# Patient Record
Sex: FEMALE | Race: BLACK OR AFRICAN AMERICAN | NOT HISPANIC OR LATINO | Employment: STUDENT | URBAN - METROPOLITAN AREA
[De-identification: names, ages, dates, MRNs, and addresses within clinical notes are randomized per-mention and may not be internally consistent; named-entity substitution may affect disease eponyms.]

---
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
## 2025-03-05 ENCOUNTER — HOSPITAL ENCOUNTER (EMERGENCY)
Facility: HOSPITAL | Age: 20
Discharge: HOME | End: 2025-03-05
Attending: EMERGENCY MEDICINE
Payer: COMMERCIAL

## 2025-03-05 VITALS
DIASTOLIC BLOOD PRESSURE: 70 MMHG | RESPIRATION RATE: 18 BRPM | WEIGHT: 293 LBS | OXYGEN SATURATION: 96 % | SYSTOLIC BLOOD PRESSURE: 138 MMHG | TEMPERATURE: 99 F | BODY MASS INDEX: 47.09 KG/M2 | HEIGHT: 66 IN | HEART RATE: 109 BPM

## 2025-03-05 DIAGNOSIS — M79.604 PAIN OF RIGHT LOWER EXTREMITY: ICD-10-CM
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
## 2025-03-05 DIAGNOSIS — M25.561 ACUTE PAIN OF RIGHT KNEE: ICD-10-CM

## 2025-03-05 DIAGNOSIS — M25.571 ACUTE RIGHT ANKLE PAIN: Primary | ICD-10-CM

## 2025-03-05 LAB
B-HCG UR QL: NEGATIVE
CTP QC/QA: YES

## 2025-03-05 PROCEDURE — 81025 URINE PREGNANCY TEST: CPT

## 2025-03-05 PROCEDURE — 25000003 PHARM REV CODE 250

## 2025-03-05 PROCEDURE — 99283 EMERGENCY DEPT VISIT LOW MDM: CPT | Mod: 25

## 2025-03-05 RX ORDER — LIDOCAINE 50 MG/G
1 PATCH TOPICAL DAILY
Qty: 14 PATCH | Refills: 0 | Status: SHIPPED | OUTPATIENT
Start: 2025-03-05

## 2025-03-05 RX ORDER — KETOROLAC TROMETHAMINE 10 MG/1
10 TABLET, FILM COATED ORAL
Status: COMPLETED | OUTPATIENT
Start: 2025-03-05 | End: 2025-03-05

## 2025-03-05 RX ORDER — IBUPROFEN 600 MG/1
600 TABLET ORAL EVERY 6 HOURS PRN
Qty: 20 TABLET | Refills: 0 | Status: SHIPPED | OUTPATIENT
Start: 2025-03-05

## 2025-03-05 RX ADMIN — KETOROLAC TROMETHAMINE 10 MG: 10 TABLET, FILM COATED ORAL at 03:03

## 2025-03-05 NOTE — DISCHARGE INSTRUCTIONS
Take ibuprofen every 6 hours as needed for pain.  Rest, ice, elevate, compress the right ankle.  Follow up with primary care doctor and orthopedic doctor.\  Thank you for coming to our Emergency Department today. It is important to remember that some problems or medical conditions are difficult to diagnose and may not be found or addressed during your Emergency Department visit.  These conditions often start with non-specific symptoms and can only be diagnosed on follow up visits with your primary care physician or specialist when the symptoms continue or change. Please remember that all medical conditions can change, and we cannot predict how you will be feeling tomorrow or the next day. Return to the ER with any questions/concerns, new/concerning symptoms, worsening or failure to improve.       Be sure to follow up with your primary care doctor and review all labs/imaging/tests that were performed during your ER visit with them. It is very common for us to identify non-emergent incidental findings which must be followed up with your primary care physician.  Some labs/imaging/tests may be outside of the normal range, and require non-emergent follow-up and/or further investigation/treatment/procedures/testing to help diagnose/exclude/prevent complications or other potentially serious medical conditions. Some abnormalities may not have been discussed or addressed during your ER visit. Some lab results may not return during your ER visit but can be accessible by downloading the free [facility] My[facility] lesley or by visiting [email or web address] . It is important for you to review all labs/imaging/tests which are outside of the normal range with your physician.    An ER visit does not replace a primary care visit, and many screening tests or follow-up tests cannot be ordered by an ER doctor or performed by the ER. Some tests may even require pre-approval.    If you do not have a primary care doctor, you may contact the one  listed on your discharge paperwork or you may also call the [facility] Appointment Desk at [phone] , or [employer]. at  [phone] to schedule an appointment, or establish care with a primary care doctor or even a specialist and to obtain information about local resources. It is important to your health that you have a primary care doctor.    Please take all medications as directed. We have done our best to select a medication for you that will treat your condition however, all medications may potentially have side-effects and it is impossible to predict which medications may give you side-effects or what those side-effects (if any) those medications may give you.  If you feel that you are having a negative effect or side-effect of any medication you should stop taking those medications immediately and seek medical attention. If you feel that you are having a life-threatening reaction call 911.        Do not drive, swim, climb to height, take a bath, operate heavy machinery, drink alcohol or take potentially sedating medications, sign any legal documents or make any important decisions for 24 hours if you have received any pain medications, sedatives or mood altering drugs during your ER visit or within 24 hours of taking them if they have been prescribed to you.     You can find additional resources for Dentists, hearing aids, durable medical equipment, low cost pharmacies and other resources at [email or web address]

## 2025-03-05 NOTE — Clinical Note
"Kristen Spencer" Steve was seen and treated in our emergency department on 3/5/2025.  She may return to school on 03/07/2025.      If you have any questions or concerns, please don't hesitate to call.      Clark Beltran PA-C"

## 2025-03-05 NOTE — ED PROVIDER NOTES
Encounter Date: 3/5/2025    SCRIBE #1 NOTE: I, Paulina Mauricio, am scribing for, and in the presence of,  Clark Romero PA-C. I have scribed the following portions of the note - Other sections scribed: HPI/ROS.       History     Chief Complaint   Patient presents with    Leg Pain     Right inner thigh pain radiating to back of right calf. Started at approx noon today. Denies any recent trauma. Hasn't treated w/ otc meds     Patient is a 19 y.o. female, with no pertinent PMHx, who presents to the ED s/p fall at work around noon today. Patient reports slipping at work with her right leg going laterally and behind her. Pt denies falling on her leg.  Pt reports pain in posterior right ankle, right calf, right inner thigh and posterior right knee pain. Additional history is provided by independent historian: Pt's mother believes pt caught herself while falling and twisted her ankle. No medication pta. Denies history of blood thrombus in chest or leg.  Denies history of cancer. Pt is on birth control pill. No recent travel or surgeries. No other exacerbating or alleviating factors. Denies chest pain, shortness of breath, swelling or other associated symptoms.       The history is provided by the patient and a parent.     Review of patient's allergies indicates:  No Known Allergies  Past Medical History:   Diagnosis Date    Allergy     Right ovarian cyst 2022     Past Surgical History:   Procedure Laterality Date    ROBOT-ASSISTED LAPAROSCOPIC EXCISION OF CYST OF OVARY USING DA WILBERTO XI Right 10/3/2022    Procedure: XI ROBOTIC EXCISION, CYST, OVARY;  Surgeon: Erick Eagle MD;  Location: [facility];  Service: OB/GYN;  Laterality: Right;     Family History   Family history unknown: Yes     Social History[1]  Review of Systems   Constitutional:  Negative for fever.   HENT:  Negative for sore throat.    Respiratory:  Negative for shortness of breath.    Cardiovascular:  Negative for chest pain.   Gastrointestinal:  Negative for  "abdominal pain, nausea and vomiting.   Genitourinary:  Negative for dysuria.   Musculoskeletal:  Positive for arthralgias and myalgias. Negative for back pain, gait problem, joint swelling, neck pain and neck stiffness.   Skin:  Negative for rash.   Neurological:  Negative for dizziness, syncope, weakness and numbness.   Hematological:  Does not bruise/bleed easily.   Psychiatric/Behavioral:  Negative for confusion.    All other systems reviewed and are negative.      Physical Exam     Initial Vitals [03/05/25 1450]   BP Pulse Resp Temp SpO2   138/70 109 18 98.9 °F (37.2 °C) 96 %      MAP       --         Physical Exam    Nursing note and vitals reviewed.  Constitutional: She appears well-developed and well-nourished.   HENT:   Head: Normocephalic and atraumatic.   Right Ear: External ear normal.   Left Ear: External ear normal.   Nose: Nose normal. Mouth/Throat: Oropharynx is clear and moist.   Eyes: Conjunctivae and lids are normal.   Neck: Trachea normal. Neck supple.   Cardiovascular:  Normal rate, regular rhythm, S1 normal, S2 normal, normal heart sounds and normal pulses.     Exam reveals no gallop and no friction rub.       No murmur heard.  Pulmonary/Chest: Effort normal and breath sounds normal. No respiratory distress. She has no decreased breath sounds. She has no wheezes. She has no rhonchi. She has no rales.   Abdominal: Abdomen is soft. Bowel sounds are normal. She exhibits no distension. There is no abdominal tenderness. There is no rebound, no guarding, no tenderness at McBurney's point and negative Stearns's sign.   Musculoskeletal:         General: Normal range of motion.      Cervical back: Neck supple.      Right hip: No tenderness or bony tenderness. Normal range of motion. Normal strength.      Left hip: No tenderness or bony tenderness. Normal range of motion. Normal strength.      Right upper leg: Tenderness ("soreness") present. No swelling or bony tenderness.      Left upper leg: No " swelling, tenderness or bony tenderness.      Right knee: Bony tenderness present. No swelling. Normal range of motion. Tenderness present.      Left knee: No swelling or bony tenderness. Normal range of motion. No tenderness.      Right lower leg: Tenderness and bony tenderness present. No swelling or deformity. No edema.      Left lower leg: No tenderness or bony tenderness. No edema.      Right ankle: No swelling. Tenderness present. Normal range of motion. Normal pulse.      Right Achilles Tendon: Em's test negative.      Left ankle: No swelling. Tenderness present. Normal range of motion. Normal pulse.      Left Achilles Tendon: Em's test negative.      Right foot: Normal range of motion and normal capillary refill. No swelling, tenderness or bony tenderness. Normal pulse.      Left foot: Normal range of motion and normal capillary refill. No swelling, tenderness or bony tenderness. Normal pulse.     Lymphadenopathy:     She has no cervical adenopathy.   Neurological: She is alert. She has normal strength. No sensory deficit.   Skin: Skin is warm and dry.   Psychiatric: She has a normal mood and affect.         ED Course   Procedures  Labs Reviewed   POCT URINE PREGNANCY       Result Value    POC Preg Test, Ur Negative       Acceptable Yes            Imaging Results              X-Ray Tibia Fibula 2 View Right (Final result)  Result time 03/05/25 16:07:06      Final result by Phil Farfan III, MD (03/05/25 16:07:06)                   Narrative:    EXAMINATION:  XR TIBIA FIBULA 2 VIEW RIGHT    CLINICAL HISTORY:  Unspecified fall, initial encounter    FINDINGS:  Tib fib two views right:    No fracture, dislocation, or bone destruction seen.  No acute process seen.      Electronically signed by: Phil Farfan MD  Date:    03/05/2025  Time:    16:07                                     X-Ray Knee 3 View Right (Edited Result - FINAL)  Result time 03/06/25 08:05:20      Addendum  (preliminary) 1 of 1 by Phil Farfan III, MD (03/06/25 08:05:20)      No fracture dislocation bone destruction seen.      Electronically signed by: Phil Farfan MD  Date:    03/06/2025  Time:    08:05                 Final result by Phil Farfan III, MD (03/05/25 16:06:46)                   Narrative:    EXAMINATION:  XR KNEE 3 VIEW RIGHT    CLINICAL HISTORY:  Unspecified fall, initial encounter    FINDINGS:  Knee three views right:    Fracture dislocation bone destruction or OCD seen.      Electronically signed by: Phil Farfan MD  Date:    03/05/2025  Time:    16:06                                     Medications   ketorolac tablet 10 mg (10 mg Oral Given 3/5/25 1545)     Medical Decision Making  Encounter Date: 3/5/2025    19 y.o. female presents for evaluation of pain in posterior right ankle, right calf, right inner thigh and posterior right knee.  Hemodynamically stable. Afebrile. Phonating and protecting the airway spontaneously. No clinical evidence for cardiovascular instability or impending airway compromise.  Remainder of physical exam as above. Prior medical records reviewed. PMD note reviewed. Current co-morbidities considered that will impact clinical decision making include as above.   Vitals range:   Temp:  [98.9 °F (37.2 °C)] 98.9 °F (37.2 °C)  Pulse:  [109] 109  Resp:  [18] 18  SpO2:  [96 %] 96 %  BP: (138)/(70) 138/70    Differential diagnoses includes but is not limited to: Fracture/dislocation, ligamentous injury, septic joint, DVT, gout, muscle strain/sprain     Patient denies other injury, hip pain, fever, and numbness/tingling.No swelling or bruising on physical exam.  Patient is able to flex their knee to 90°.  Patient is able to bear weight and can take four steps in the emergency department. Patient was able to take four steps immediately following the injury as well.  Patient has full range of motion in her knee.  No fever or other systemic symptoms. I am not concerned for  septic joint. No calf swelling or localized tenderness along the deep venous system. Patient does take birth control, however symptoms of pain in the leg occurred after injury. I have low suspcion for DVT. Knee is not red, or swollen. I am not concerned for gout. Presentation less suspicious for possible ligamentous or meniscal injury, considering patient is able to bear weight, no popping sound, or obvious effusion. Negative Em's test. I am not concerned for achilles tendon rupture. Xray of tib/fib and knee show no acute abnormalities.      Presentation consistent with possible muscle strain. Pain controlled in ED with Toradol. Ace bandage applied in the ED. Discharged home with supportive care. I discussed RICE treatment with the patient. Instructed to follow up with PCP and orthopedics for further evaluation and management of symptoms.    Clinical picture today most consistent with muscle strain/sprain.   Doubt alternate pathology as listed in the differential above.    ED MEDS GIVEN:  Medications  ketorolac tablet 10 mg (10 mg Oral Given 3/5/25 7865)    Clinical Impression:  Final diagnoses:  [W19.XXXA] Fall  [M25.571] Acute right ankle pain (Primary)  [M25.561] Acute pain of right knee  [M79.604] Pain of right lower extremity    I discussed with the patient/family the diagnosis, treatment plan, indications for return to the emergency department, and for expected follow-up. The patient/family verbalized an understanding. The patient/family is asked if there are any questions or concerns. We discuss the case, until all issues are addressed to the patient/family's satisfaction. Patient/family understands and is agreeable to the plan.   Clark Beltran    DISCLAIMER: This note was prepared with [employer] voice recognition transcription software. Garbled syntax, mangled pronouns, and other bizarre constructions may be attributed to that software system.      Amount and/or Complexity of Data Reviewed  Independent  Historian: parent     Details: See HPI   Labs: ordered.  Radiology: ordered.    Risk  Prescription drug management.            Scribe Attestation:   Scribe #1: I performed the above scribed service and the documentation accurately describes the services I performed. I attest to the accuracy of the note.                           I, Clark Beltran PA-C, personally performed the services described in this documentation. All medical record entries made by the scribe were at my direction and in my presence. I have reviewed the chart and agree that the record reflects my personal performance and is accurate and complete.      DISCLAIMER: This note was prepared with [employer] voice recognition transcription software. Garbled syntax, mangled pronouns, and other bizarre constructions may be attributed to that software system.        Clinical Impression:  Final diagnoses:  [W19.XXXA] Fall  [M25.571] Acute right ankle pain (Primary)  [M25.561] Acute pain of right knee  [M79.604] Pain of right lower extremity          ED Disposition Condition    Discharge Stable          ED Prescriptions       Medication Sig Dispense Start Date End Date Auth. Provider    ibuprofen (ADVIL,MOTRIN) 600 MG tablet Take 1 tablet (600 mg total) by mouth every 6 (six) hours as needed for Pain. 20 tablet 3/5/2025 -- Clark Beltran PA-C    LIDOcaine (LIDODERM) 5 % Place 1 patch onto the skin once daily. Remove & Discard patch within 12 hours or as directed by MD 14 patch 3/5/2025 -- Clark Beltran PA-C          Follow-up Information       Follow up With Specialties Details Why Contact Info    Sajan Mendez MD Pediatrics Schedule an appointment as soon as possible for a visit in 1 day For further evaluation, more definitive management of symptoms [address] [facility] [address]  [facility] [id number]  [phone]      [facility] - Emergency Dept Emergency Medicine Go to  As needed, If symptoms worsen [address] [facility]  [facility] [id number] [phone]    Alpesh Rasmussen MD Orthopedic Surgery Schedule an appointment as soon as possible for a visit in 1 day For further evaluation, more definitive management of symptoms [address] [facility] [address]  [facility] [address]  [phone]                   [1]   Social History  Tobacco Use    Smoking status: Never    Smokeless tobacco: Never   Substance Use Topics    Alcohol use: Never    Drug use: Never        Clark Beltran PAFermínC  03/06/25 1144

## (undated) DEVICE — APPLICATOR CHLORAPREP ORN 26ML

## (undated) DEVICE — SUT MCRYL PLUS 4-0 PS2 27IN

## (undated) DEVICE — KIT PROCEDURE STER INLET CLOSU

## (undated) DEVICE — SUT VICRYL+ 27 UR-6 VIOL

## (undated) DEVICE — INSERT CUSHIONPRONE VIEW LARGE

## (undated) DEVICE — SEAL UNIVERSAL 5MM-8MM XI

## (undated) DEVICE — GOWN NONREINF SET-IN SLV XL

## (undated) DEVICE — SYS SEE SHARP SCOPE ANTIFOG

## (undated) DEVICE — SOL NS 1000CC

## (undated) DEVICE — SOL BETADINE 5%

## (undated) DEVICE — CONTAINER SPECIMEN OR STER 4OZ

## (undated) DEVICE — BAG TISSUE RETRIEVAL 5MM

## (undated) DEVICE — DRAPE INCISE IOBAN 2 23X17IN

## (undated) DEVICE — SUT STRATAFIX 0 PDS+ 22CM 9IN

## (undated) DEVICE — SOL ELECTROLUBE ANTI-STIC

## (undated) DEVICE — TRAY DO THE ROBOT

## (undated) DEVICE — JELLY SURGILUBE 5GR

## (undated) DEVICE — PAD PERI POST REPLACEMNT

## (undated) DEVICE — SCRUB 10% POVIDONE IODINE 4OZ

## (undated) DEVICE — OBTURATOR BLADELESS 8MM XI CLR

## (undated) DEVICE — SET TRI-LUMEN FILTERED TUBE

## (undated) DEVICE — ELECTRODE REM PLYHSV RETURN 9

## (undated) DEVICE — PORT ACCESS 5MM W/120MM

## (undated) DEVICE — UNDERGLOVE BIOGEL PI SZ 6.5 LF

## (undated) DEVICE — TOWEL OR DISP STRL BLUE 4/PK

## (undated) DEVICE — CLIPPER BLADE MOD 4406 (CAREF)

## (undated) DEVICE — SYR 10CC LUER LOCK

## (undated) DEVICE — DRAPE COLUMN DAVINCI XI

## (undated) DEVICE — CARTRIDGE BABCOCK GRASPER 5X38

## (undated) DEVICE — CLOSURE SKIN STERI STRIP 1/2X4

## (undated) DEVICE — UNDERGLOVES BIOGEL PI SZ 7 LF

## (undated) DEVICE — TROCAR KII BLLN 12MM 10CM

## (undated) DEVICE — IRRIGATOR ENDOSCOPY DISP.

## (undated) DEVICE — SYR 50ML CATH TIP

## (undated) DEVICE — DRAPE ARM DAVINCI XI

## (undated) DEVICE — KIT WING PAD POSITIONING

## (undated) DEVICE — SOL WATER STRL IRR 1000ML